# Patient Record
Sex: FEMALE
[De-identification: names, ages, dates, MRNs, and addresses within clinical notes are randomized per-mention and may not be internally consistent; named-entity substitution may affect disease eponyms.]

---

## 2022-09-28 PROBLEM — Z00.00 ENCOUNTER FOR PREVENTIVE HEALTH EXAMINATION: Status: ACTIVE | Noted: 2022-09-28

## 2022-09-29 ENCOUNTER — APPOINTMENT (OUTPATIENT)
Dept: OTOLARYNGOLOGY | Facility: CLINIC | Age: 42
End: 2022-09-29

## 2022-09-29 VITALS — HEIGHT: 63 IN | TEMPERATURE: 96 F | BODY MASS INDEX: 30.12 KG/M2 | WEIGHT: 170 LBS

## 2022-09-29 DIAGNOSIS — H70.90 UNSPECIFIED MASTOIDITIS, UNSPECIFIED EAR: ICD-10-CM

## 2022-09-29 DIAGNOSIS — H93.299 OTHER ABNORMAL AUDITORY PERCEPTIONS, UNSPECIFIED EAR: ICD-10-CM

## 2022-09-29 PROCEDURE — 31231 NASAL ENDOSCOPY DX: CPT

## 2022-09-29 PROCEDURE — 99205 OFFICE O/P NEW HI 60 MIN: CPT | Mod: 25

## 2022-09-29 PROCEDURE — 92557 COMPREHENSIVE HEARING TEST: CPT

## 2022-09-29 PROCEDURE — 92550 TYMPANOMETRY & REFLEX THRESH: CPT | Mod: 52

## 2022-09-29 PROCEDURE — 69420 INCISION OF EARDRUM: CPT | Mod: RT

## 2022-09-29 RX ORDER — MECLIZINE HYDROCHLORIDE 25 MG/1
25 TABLET ORAL
Qty: 30 | Refills: 0 | Status: ACTIVE | COMMUNITY
Start: 2022-09-29 | End: 1900-01-01

## 2022-09-29 RX ORDER — SULFAMETHOXAZOLE AND TRIMETHOPRIM 800; 160 MG/1; MG/1
800-160 TABLET ORAL
Qty: 28 | Refills: 0 | Status: ACTIVE | COMMUNITY
Start: 2022-09-29 | End: 1900-01-01

## 2022-09-29 RX ORDER — ONDANSETRON 4 MG/1
4 TABLET ORAL
Qty: 20 | Refills: 0 | Status: ACTIVE | COMMUNITY
Start: 2022-09-29 | End: 1900-01-01

## 2022-10-01 NOTE — PHYSICAL EXAM
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 All risks and benefits were discussed with the patient and consent obtained.\par \par Procedure Note:\par \par Nasal endoscopy was done with topical anesthesia and a fiberoptic endoscope.\par Indication: Nasal congestion, rule out sinusitis.\par Procedure: The nasal cavity was anesthetized with topical Afrin and Pontocaine. An  endoscope was used and inserted into the nasal cavity. \par Endocoscopy was performed to inspect the interior of the nasal cavity, the nasal septum,  the middle and superior meati, the inferior, middle and superior turbinates, and the spheno-ethmoidal  recesses, the nasopharynx and eustachian tube orifices bilaterally\par  This showed that the nasal mucosa was slightly boggy.  There was a moderate S-shaped septal deflection.  However, the middle meati were open.  There were no polyps, purulence or masses.  The eustachian tube orifices were clear.  The nasopharynx was benign and without mass.  The inferior and middle turbinates were slightly boggy, the superior meati were normal and the sphenoethmoidal recesses were without evidence of disease.\par \par She had a large subtotal cartilaginous septal perforation\par \par Because of the persistence of a conductive hearing loss and a pulsatile acute otitis media in spite of medical care was felt that the patient would benefit from myringotomy.  The option of tube versus not using the tube was discussed and it was decided to not place a tube at this visit.  Should the effusion recur, we will likely place a tube at that point.  All risks and benefits of the procedure were discussed with the patient understood and agreed.\par \par An operating microscope was used and brought into the field.  Attention was paid to the right ear.  Cerumen in the canal was removed.  Evaluation showed no evidence of cholesteatoma or significant retraction pocket.  There was purulent pulsatile drainage.  This was suctioned and cultured.  Phenol was placed as a topical anesthetic in the posterior inferior quadrant.  A wide field myringotomy was performed and took a purulent fluid suctioned from the middle ear space without trauma and with immediate improvement in the patient's symptoms.  The pulsatile drainage ceased.\par \par There were no complications.

## 2022-10-01 NOTE — ASSESSMENT
[FreeTextEntry1] : It was my impression that she has a traumatic septal perforation for which I did not recommend intervention.\par She has purulent pulsating otitis media through a course of Augmentin and Ciprodex with secondary labyrinthitis.  The eardrum was pulsating and a wide-field myringotomy was done after culturing.  With this, the pulsatile drainage stopped.  All risks and benefits were discussed and I could not say for sure that this was not starting from CSF.\par Pending the culture results I switched her to Bactrim DS, a brief course of prednisone and otherwise continuing on her medications and I wanted her to see Dr. Varela next week or return earlier if the symptoms do not improve.\par \par I sent her for CT imaging of the temporal bones although there was no mastoid tenderness at this point and no nuchal rigidity\par

## 2022-10-01 NOTE — CONSULT LETTER
[FreeTextEntry2] : PREET LAM\par  [FreeTextEntry1] : \par \par Dear  Dr. PREET LAM,\par \par I had the pleasure of seeing your patient today.  \par Please see my note below.\par \par \par Thank you very much for allowing me to participate in the care of your patient.\par \par Sincerely,\par \par \par Bennie Guaman MD\par [facility]\par [facility]\par  [facility]\par \par

## 2022-10-01 NOTE — REVIEW OF SYSTEMS
[Hearing Loss] : hearing loss [Vertigo] : vertigo [Ear Drainage] : ear drainage [de-identified] : ear pressure

## 2022-10-01 NOTE — ADDENDUM
[FreeTextEntry1] : 10/1/22   culture-  no growth on augmentin\par \par She has follow up with Dr. Partida on Monday,   Dr. Avery PAIGE.  RLP

## 2022-10-01 NOTE — HISTORY OF PRESENT ILLNESS
[de-identified] : Ms. Shaver was seen in consultation on September 29th.   Afer an acute URI, she developed pain, hearing loss and purulent otorrhea on the right side about 9 days ago..  This was accompanied by spinning and nausea.  She was placed on Augmentin and cipro otic drops, but really is not much better.  She still has copious purulent discharge.   She has no previous history of ear disease and is non diabetic.  She was placed on meclizine and zofiran with some symptomatic improvement in her dizziness and nausea.  She has no other head and neck complaints.

## 2022-10-03 ENCOUNTER — APPOINTMENT (OUTPATIENT)
Dept: CT IMAGING | Facility: CLINIC | Age: 42
End: 2022-10-03

## 2022-10-03 ENCOUNTER — APPOINTMENT (OUTPATIENT)
Dept: OTOLARYNGOLOGY | Facility: CLINIC | Age: 42
End: 2022-10-03

## 2022-10-03 DIAGNOSIS — Z92.89 PERSONAL HISTORY OF OTHER MEDICAL TREATMENT: ICD-10-CM

## 2022-10-03 DIAGNOSIS — Z78.9 OTHER SPECIFIED HEALTH STATUS: ICD-10-CM

## 2022-10-03 DIAGNOSIS — Z72.89 OTHER PROBLEMS RELATED TO LIFESTYLE: ICD-10-CM

## 2022-10-03 PROCEDURE — 99213 OFFICE O/P EST LOW 20 MIN: CPT | Mod: 25

## 2022-10-03 PROCEDURE — 92504 EAR MICROSCOPY EXAMINATION: CPT

## 2022-10-04 PROBLEM — Z72.89 CONSUMES ALCOHOL: Status: ACTIVE | Noted: 2022-09-29

## 2022-10-04 PROBLEM — Z92.89 HISTORY OF PSYCHIATRIC CARE: Status: RESOLVED | Noted: 2022-09-29 | Resolved: 2022-10-04

## 2022-10-04 PROBLEM — Z78.9 NON-SMOKER: Status: ACTIVE | Noted: 2022-09-29

## 2022-10-04 NOTE — HISTORY OF PRESENT ILLNESS
[de-identified] : MORENO RICHARDSON is a 42 year old patient here for follow-up for right labyrinthitis.  She saw Dr. Guaman on September 29.  She had developed ear pain, hearing loss, otorrhea, and dizziness after an upper respiratory tract infection.  He performed a myringotomy and changed her antibiotics to Bactrim.  She is on antibiotic eardrops as well.  She said that she still has drainage but the dizziness has improved somewhat.  She has not noticed any change in her hearing.  She has no ear pain.  She is also taking meclizine and Zofran.  She was also given steroids but could not tolerate them.  She stopped them.  Her culture is negative to date.\par \par She had recurrent middle ear infections as a child.  She has no history of prior otologic surgery.

## 2022-10-04 NOTE — ASSESSMENT
[FreeTextEntry1] : She has acute otitis media with labyrinthitis following an upper respiratory tract infection.  She had myringotomy performed last week.  She has had some improvement.  There was drainage which was suctioned today.  There is mild inflammation of the canal.  The dizziness is improving.  She is still on meclizine and Zofran.\par \par Plan\par -Findings and management options were discussed with the patient.\par -Dry ear precautions recommended.  She was given earplugs\par -She will continue the antibiotics and the antibiotic eardrops\par -She also continues Flonase and a decongestant\par -She may try and wean herself off the meclizine and Zofran\par -Final culture results are pending\par -Dr. Guaman had also sent her for CT scan which is pending\par -She was asked to follow-up with either Dr. Guaman or myself either later this week or early next week.\par -She should call return earlier if she has any concerns or worsening symptoms

## 2022-10-05 LAB — EAR NOSE AND THROAT CULTURE: NORMAL

## 2022-10-12 ENCOUNTER — APPOINTMENT (OUTPATIENT)
Dept: OTOLARYNGOLOGY | Facility: CLINIC | Age: 42
End: 2022-10-12

## 2022-10-12 VITALS — HEIGHT: 63 IN | TEMPERATURE: 96 F | BODY MASS INDEX: 30.12 KG/M2 | WEIGHT: 170 LBS

## 2022-10-12 PROCEDURE — 99214 OFFICE O/P EST MOD 30 MIN: CPT | Mod: 25

## 2022-10-12 RX ORDER — SULFAMETHOXAZOLE AND TRIMETHOPRIM 800; 160 MG/1; MG/1
800-160 TABLET ORAL
Qty: 28 | Refills: 0 | Status: ACTIVE | COMMUNITY
Start: 2022-10-12 | End: 1900-01-01

## 2022-10-13 NOTE — PHYSICAL EXAM
[FreeTextEntry1] : General:\par The patient was alert and oriented and in no distress.\par Voice was clear.  She looks much better\par \par \par Tuning forks:\par The Tam evaluation lateralized to the right\par Bone-conduction was better than air conduction on the right\par \par \par Ears:\par The left ear canal was clear the left eardrum was intact and mobile.  On the right, the tympanic membrane had closed and she had an effusion.\par \par \par Because of the persistence of a conductive hearing loss and an effusion in spite of medical care was felt that the patient would benefit from myringotomy.  The option of tube versus not using the tube was discussed and it was decided to place a tube at this visit as the previous myringotomy ahd closed.  .  All risks and benefits of the procedure were discussed with the patient understood and agreed.\par \par An operating microscope was used and brought into the field.  Attention was paid to the right ear.  Cerumen in the canal was removed.  Evaluation showed no evidence of cholesteatoma or significant retraction pocket.  There was an effusion in the middle ear space.  Phenol was placed as a topical anesthetic in the posterior inferior quadrant.  A myringotomy was performed and thick fluid suctioned from the middle ear space without trauma and with immediate improvement in the patient's symptoms.  A .04 chatman tube was placed atraumatically.\par \par There were no complications.

## 2022-10-13 NOTE — HISTORY OF PRESENT ILLNESS
[de-identified] : MORENO RICHARDSON was seen in follow up ion October 12th.   She was improved, but still noted hearing loss in the right and dizziness and fullness.\par The patient had no other ear nose or throat complaints at this visit.

## 2022-10-13 NOTE — ASSESSMENT
[FreeTextEntry1] : It was my impression that she had the acute purulent otitis media with secondary labyrinthitis through Augmentin.  I took a culture last visit which showed no resistant organisms but had a ready changed her to Bactrim.  The tympanostomy, though wide-field, had already closed and her tuning fork lateralized to the right ear.  I repeated the myringotomy and 8.4 Clay tube was inserted atraumatically.  At this point, the middle ear discharge was more gelatinous and no longer purulent.\par I had recommended imaging and again suggested going back for this.\par I continued her on Bactrim and suggested probiotics\par I recommended continuing on the Cipro drops.\par She did not want to wait for the hearing test today but I suggested audiometry and imaging before her next visit.

## 2022-10-13 NOTE — CONSULT LETTER
[FreeTextEntry2] : PREET LAM\par  [FreeTextEntry1] : \par \par Dear  Dr. PREET LAM,\par \par I had the pleasure of seeing your patient today.  \par Please see my note below.\par \par \par Thank you very much for allowing me to participate in the care of your patient.\par \par Sincerely,\par \par \par Bennie Guaman MD\par [facility]\par [facility]\par  [facility]\par \par

## 2022-11-07 ENCOUNTER — APPOINTMENT (OUTPATIENT)
Dept: OTOLARYNGOLOGY | Facility: CLINIC | Age: 42
End: 2022-11-07

## 2022-11-07 VITALS — TEMPERATURE: 95 F | BODY MASS INDEX: 30.12 KG/M2 | WEIGHT: 170 LBS | HEIGHT: 63 IN

## 2022-11-07 DIAGNOSIS — H83.01 LABYRINTHITIS, RIGHT EAR: ICD-10-CM

## 2022-11-07 DIAGNOSIS — H90.11 CONDUCTIVE HEARING LOSS, UNILATERAL, RIGHT EAR, WITH UNRESTRICTED HEARING ON THE CONTRALATERAL SIDE: ICD-10-CM

## 2022-11-07 PROCEDURE — 92557 COMPREHENSIVE HEARING TEST: CPT

## 2022-11-07 PROCEDURE — 92567 TYMPANOMETRY: CPT

## 2022-11-07 PROCEDURE — 92504 EAR MICROSCOPY EXAMINATION: CPT

## 2022-11-07 PROCEDURE — 99214 OFFICE O/P EST MOD 30 MIN: CPT

## 2022-11-07 RX ORDER — PREDNISONE 20 MG/1
20 TABLET ORAL
Qty: 10 | Refills: 0 | Status: DISCONTINUED | COMMUNITY
Start: 2022-09-29 | End: 2022-11-07

## 2022-11-07 RX ORDER — CIPROFLOXACIN AND DEXAMETHASONE 3; 1 MG/ML; MG/ML
0.3-0.1 SUSPENSION/ DROPS AURICULAR (OTIC) TWICE DAILY
Qty: 1 | Refills: 1 | Status: DISCONTINUED | COMMUNITY
Start: 2022-09-29 | End: 2022-11-07

## 2022-11-07 NOTE — DATA REVIEWED
[de-identified] : In light of the patients current symptoms, Complete audiometry was ordered and completed today. I have interpreted these results and reviewed them in detail with the patient.\par \par Today's testing is compared to prior testing.  Significantly improved conductive hearing loss in the right ear

## 2022-11-07 NOTE — ASSESSMENT
[FreeTextEntry1] : Significant improving symptoms following right otitis media and labyrinthitis.  Hearing has significantly improved.  Wound instructions reviewed.  No medical intervention recommended at this time.  Continue clinical monitoring advised.

## 2022-11-07 NOTE — PHYSICAL EXAM
[FreeTextEntry1] : Procedure: Microscopic Ear Exam\par \par Left ear:  \par Ear canal intact without inflammation or lesion.  \par Tympanic membrane intact without inflammation.\par \par \par Right ear:  \par Ear canal intact without inflammation or lesion.  Mild crusting without inflammation.  A ventilation tube remains in place in the posterior tympanic membrane.  No effusion or discharge identified. [de-identified] : Large septal perforation [Normal] : mucosa is normal [Midline] : trachea located in midline position

## 2022-11-07 NOTE — CONSULT LETTER
[Please see my note below.] : Please see my note below. [FreeTextEntry2] : Dear PREET LAM  [FreeTextEntry1] : Thank you for allowing me to participate in the care of MORENO RICHARDSON .\par Please see the attached visit note.\par \par \par \par Edmund Varela\par Otology\par Medical Director [facility]\par Department of Otolaryngology\par [facility]

## 2022-11-07 NOTE — HISTORY OF PRESENT ILLNESS
[de-identified] : Referred by ENT colleague for subspecialty evaluation.\par MORENO RICHARDSON has a history of severe right ear infection which included ear fullness pain otorrhea and with vertigo.  She was treated for purulent otitis media with labyrinthitis.  She was treated with oral antibiotics, otic drops, and myringotomy.  She reports improvement.  Continues to have mild fullness in the right ear without overt otorrhea.  Minimal dizziness remaining.   \par Frequent ear infections as a child.  None recently.

## 2022-11-08 PROBLEM — H90.11 CONDUCTIVE HEARING LOSS OF RIGHT EAR WITH UNRESTRICTED HEARING OF LEFT EAR: Status: ACTIVE | Noted: 2022-11-08

## 2023-01-05 ENCOUNTER — APPOINTMENT (OUTPATIENT)
Dept: OTOLARYNGOLOGY | Facility: CLINIC | Age: 43
End: 2023-01-05
Payer: COMMERCIAL

## 2023-01-05 VITALS — WEIGHT: 180 LBS | BODY MASS INDEX: 31.89 KG/M2 | HEIGHT: 63 IN | TEMPERATURE: 95 F

## 2023-01-05 DIAGNOSIS — H66.011 ACUTE SUPPURATIVE OTITIS MEDIA WITH SPONTANEOUS RUPTURE OF EAR DRUM, RIGHT EAR: ICD-10-CM

## 2023-01-05 DIAGNOSIS — J31.0 CHRONIC RHINITIS: ICD-10-CM

## 2023-01-05 DIAGNOSIS — J34.89 OTHER SPECIFIED DISORDERS OF NOSE AND NASAL SINUSES: ICD-10-CM

## 2023-01-05 DIAGNOSIS — H93.293 OTHER ABNORMAL AUDITORY PERCEPTIONS, BILATERAL: ICD-10-CM

## 2023-01-05 PROCEDURE — 92557 COMPREHENSIVE HEARING TEST: CPT

## 2023-01-05 PROCEDURE — 31231 NASAL ENDOSCOPY DX: CPT

## 2023-01-05 PROCEDURE — 99214 OFFICE O/P EST MOD 30 MIN: CPT | Mod: 25

## 2023-01-05 PROCEDURE — 92567 TYMPANOMETRY: CPT

## 2023-01-05 NOTE — PHYSICAL EXAM
[FreeTextEntry1] : \par The patient was alert and oriented and in no distress.\par Voice was clear.\par \par Face:\par The patient had no facial asymmetry or mass.\par The skin was unremarkable.\par \par Eyes:\par The pupils were equal round and reactive to light and accommodation.\par There was no significant nystagmus or disconjugate gaze noted.\par \par Nose: \par The external nose had no significant deformity.  There was no facial tenderness.  On anterior rhinoscopy, the nasal mucosa was clear.  The anterior septum was midline.  There were no visualized polyps purulence  or masses.\par \par Oral cavity:\par The oral mucosa was normal.\par The oral and base of tongue were clear and without mass.\par The gingival and buccal mucosa were moist and without lesions.\par The palate moved well.\par There was no cleft to the palate.\par There appeared to be good salivary flow.  \par There was no pus, erythema or mass in the oral cavity.\par \par \par Ears:\par The external ears were normal without deformity.\par The tube is in the canal on the right with some debris in the left eardrum is intact and mobile..\par \par Neck: \par The neck was symmetrical.\par The parotid and submandibular glands were normal without masses.\par The trachea was midline and there was no unusual crepitus.\par The thyroid was smooth and nontender and no masses were palpated.\par There was no significant cervical adenopathy.\par \par \par Neuro:\par Neurologically, the patient was awake, alert, and oriented to person, place and time. There were no obvious focal neurologic abnormalities.  Cranial nerves II through XII were grossly intact.\par \par \par TMJ:\par The temporomandibular joints were nontender.\par There was no abnormal crepitus and no significant malocclusion\par  [de-identified] : Audiometry:\par \par Comprehensive audiometry showed that both ears had normal hearing.  The tympanograms were type A and the otoacoustic emissions were intact bilaterally.\par The audiogram was reviewed with the patient.   Nasal endoscopy: \par CPT 16693\par Procedure Note:\par \par Endoscopy was done with Covid precautions and with video. All risks and benefits were discussed with the patient and consent obtained.\par \par Nasal endoscopy was done with topical anesthesia of Pontocaine and Afrin and a      nasal endoscope.\par Indication: Nasal congestion, rule out sinusitis.\par Procedure: The nasal cavity was anesthetized with topical Afrin and Pontocaine. An  endoscope was used and inserted into the nasal cavity.\par Attention was first paid to the anterior nasal cavity.\par Endocoscopy was performed to inspect the interior of the nasal cavity, the nasal septum,  the middle and superior meati, the inferior, middle and superior turbinates, and the spheno-ethmoidal  recesses, the nasopharynx and eustachian tube orifices bilaterally. \par All findings were normal except:\par She has the subtotal septal perforation with some crusting but without evidence of middle meatal disease or eustachian tube obstruction.

## 2023-01-05 NOTE — ASSESSMENT
[FreeTextEntry1] : It was my impression that she probably had a left-sided otitis media with her recent COVID infection.  In any case, at this point her exam is normal.  The hearing is excellent and she has type a tympanograms in both ears.  The tube is sitting in the wax on the right.  She notes some pruritus on the left and I felt she could probably use the Ciprodex drops for a few days to see if that helps but anyway I reassured her that she is doing quite well.  I would like to see her back in follow-up in 6 months and I remove the tube from the canal if it remains and hopefully she will not have recurrent issues but if so I would plan eustachian tube dilation.

## 2023-01-05 NOTE — CONSULT LETTER
[FreeTextEntry2] : PREET LAM\par  [FreeTextEntry1] : \par \par Dear  Dr. PREET LAM,\par \par I had the pleasure of seeing your patient today.  \par Please see my note below.\par \par \par Thank you very much for allowing me to participate in the care of your patient.\par \par Sincerely,\par \par \par Bennie Guaman MD\par [facility]\par [facility]\par  [facility]\par \par